# Patient Record
Sex: FEMALE | URBAN - METROPOLITAN AREA
[De-identification: names, ages, dates, MRNs, and addresses within clinical notes are randomized per-mention and may not be internally consistent; named-entity substitution may affect disease eponyms.]

---

## 2024-11-30 ENCOUNTER — TELEPHONE ENCOUNTER (OUTPATIENT)
Dept: URBAN - METROPOLITAN AREA CLINIC 23 | Facility: CLINIC | Age: 79
End: 2024-11-30

## 2024-12-10 ENCOUNTER — DASHBOARD ENCOUNTERS (OUTPATIENT)
Age: 79
End: 2024-12-10

## 2024-12-10 ENCOUNTER — OFFICE VISIT (OUTPATIENT)
Dept: URBAN - METROPOLITAN AREA CLINIC 23 | Facility: CLINIC | Age: 79
End: 2024-12-10
Payer: COMMERCIAL

## 2024-12-10 VITALS
HEART RATE: 76 BPM | HEIGHT: 60 IN | WEIGHT: 154 LBS | TEMPERATURE: 97.7 F | BODY MASS INDEX: 30.23 KG/M2 | DIASTOLIC BLOOD PRESSURE: 67 MMHG | SYSTOLIC BLOOD PRESSURE: 114 MMHG

## 2024-12-10 DIAGNOSIS — K27.9 PEPTIC ULCER: ICD-10-CM

## 2024-12-10 DIAGNOSIS — K21.00 GASTROESOPHAGEAL REFLUX DISEASE WITH ESOPHAGITIS WITHOUT HEMORRHAGE: ICD-10-CM

## 2024-12-10 DIAGNOSIS — K92.1 MELENA: ICD-10-CM

## 2024-12-10 PROBLEM — 266433003: Status: ACTIVE | Noted: 2024-12-10

## 2024-12-10 PROBLEM — 13200003: Status: ACTIVE | Noted: 2024-12-10

## 2024-12-10 PROCEDURE — 99214 OFFICE O/P EST MOD 30 MIN: CPT | Performed by: STUDENT IN AN ORGANIZED HEALTH CARE EDUCATION/TRAINING PROGRAM

## 2024-12-10 NOTE — PREVIOUS WORKUP ENDOSCOPIES
2024/11/30 EGD: Irregular Z-line, LA grade a esophagitis, clean-based gastric and duodenal ulcers, path negative for H. pylori

## 2024-12-10 NOTE — HPI-TODAY'S VISIT:
Ms. Fuentes is an 78-year-old woman with a history of hypertension, hyperlipidemia who presents as a hospital follow-up for peptic ulcers, melena, blood loss anemia.  She is accompanied by her son who aids in providing details of the medical history.  They report she is she has not had any blood per rectum or melena since discharge.  She feels like her stomach pain is completely resolved and she is continuing to take the pantoprazole nightly.  She does have significant wrist pain and asks what pain medicines can be used for this.  She is otherwise feeling well.

## 2024-12-23 ENCOUNTER — WEB ENCOUNTER (OUTPATIENT)
Dept: URBAN - METROPOLITAN AREA CLINIC 23 | Facility: CLINIC | Age: 79
End: 2024-12-23

## 2024-12-23 RX ORDER — PANTOPRAZOLE SODIUM 40 MG/1
1 TABLET TABLET, DELAYED RELEASE ORAL ONCE A DAY
Qty: 90 TABLET | Refills: 3 | OUTPATIENT
Start: 2024-12-23

## 2025-02-14 ENCOUNTER — OFFICE VISIT (OUTPATIENT)
Dept: URBAN - METROPOLITAN AREA CLINIC 23 | Facility: CLINIC | Age: 80
End: 2025-02-14

## 2025-03-03 ENCOUNTER — OFFICE VISIT (OUTPATIENT)
Dept: URBAN - METROPOLITAN AREA CLINIC 23 | Facility: CLINIC | Age: 80
End: 2025-03-03
Payer: COMMERCIAL

## 2025-03-03 VITALS
HEART RATE: 73 BPM | WEIGHT: 153 LBS | TEMPERATURE: 98.1 F | BODY MASS INDEX: 30.04 KG/M2 | HEIGHT: 60 IN | DIASTOLIC BLOOD PRESSURE: 61 MMHG | SYSTOLIC BLOOD PRESSURE: 135 MMHG

## 2025-03-03 DIAGNOSIS — K21.00 GASTROESOPHAGEAL REFLUX DISEASE WITH ESOPHAGITIS WITHOUT HEMORRHAGE: ICD-10-CM

## 2025-03-03 DIAGNOSIS — K92.1 MELENA: ICD-10-CM

## 2025-03-03 DIAGNOSIS — K27.9 PEPTIC ULCER: ICD-10-CM

## 2025-03-03 PROCEDURE — 99214 OFFICE O/P EST MOD 30 MIN: CPT | Performed by: STUDENT IN AN ORGANIZED HEALTH CARE EDUCATION/TRAINING PROGRAM

## 2025-03-03 RX ORDER — PANTOPRAZOLE SODIUM 40 MG/1
1 TABLET TABLET, DELAYED RELEASE ORAL ONCE A DAY
Qty: 90 TABLET | Refills: 3 | Status: ACTIVE | COMMUNITY
Start: 2024-12-23

## 2025-03-03 NOTE — HPI-TODAY'S VISIT:
- Follow up visit for gastrointestinal symptoms. - Reports resolution of blood in stool, reflux, abdominal pain, and chest pain. - Currently taking pantoprazole. - Avoiding NSAIDs (ibuprofen, naproxen) as previously advised. - Using acetaminophen for wrist pain.

## 2025-03-03 NOTE — HPI-MIGRATED HPI
last ov Ms. Fuentes is an 78-year-old woman with a history of hypertension, hyperlipidemia who presents as a hospital follow-up for peptic ulcers, melena, blood loss anemia. She is accompanied by her son who aids in providing details of the medical history. They report she is she has not had any blood per rectum or melena since discharge. She feels like her stomach pain is completely resolved and she is continuing to take the pantoprazole nightly. She does have significant wrist pain and asks what pain medicines can be used for this. She is otherwise feeling well.

## 2025-03-13 ENCOUNTER — TELEPHONE ENCOUNTER (OUTPATIENT)
Dept: URBAN - METROPOLITAN AREA CLINIC 23 | Facility: CLINIC | Age: 80
End: 2025-03-13

## 2025-03-17 ENCOUNTER — LAB OUTSIDE AN ENCOUNTER (OUTPATIENT)
Dept: URBAN - METROPOLITAN AREA CLINIC 23 | Facility: CLINIC | Age: 80
End: 2025-03-17

## 2025-03-19 LAB — H PYLORI BREATH TEST: NOT DETECTED
